# Patient Record
Sex: FEMALE | Race: WHITE
[De-identification: names, ages, dates, MRNs, and addresses within clinical notes are randomized per-mention and may not be internally consistent; named-entity substitution may affect disease eponyms.]

---

## 2018-01-07 ENCOUNTER — HOSPITAL ENCOUNTER (EMERGENCY)
Dept: HOSPITAL 69 - ER | Age: 48
Discharge: HOME | End: 2018-01-07
Payer: SELF-PAY

## 2018-01-07 VITALS — DIASTOLIC BLOOD PRESSURE: 75 MMHG | SYSTOLIC BLOOD PRESSURE: 151 MMHG
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
## 2018-01-07 DIAGNOSIS — G89.29: ICD-10-CM

## 2018-01-07 DIAGNOSIS — S16.1XXA: ICD-10-CM

## 2018-01-07 DIAGNOSIS — S00.03XA: Primary | ICD-10-CM

## 2018-01-07 RX ADMIN — MORPHINE SULFATE ONE MG: 4 INJECTION, SOLUTION INTRAMUSCULAR; INTRAVENOUS at 18:54

## 2018-01-07 RX ADMIN — MORPHINE SULFATE ONE MG: 4 INJECTION, SOLUTION INTRAMUSCULAR; INTRAVENOUS at 17:44
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
